# Patient Record
Sex: FEMALE | Race: WHITE | NOT HISPANIC OR LATINO | URBAN - METROPOLITAN AREA
[De-identification: names, ages, dates, MRNs, and addresses within clinical notes are randomized per-mention and may not be internally consistent; named-entity substitution may affect disease eponyms.]

---

## 2023-11-06 PROBLEM — R10.2 VULVAR PAIN: Status: ACTIVE | Noted: 2023-11-06

## 2023-11-06 PROBLEM — E55.9 VITAMIN D DEFICIENCY: Status: ACTIVE | Noted: 2023-11-06

## 2023-11-06 PROBLEM — M17.0 OSTEOARTHRITIS OF KNEES, BILATERAL: Status: ACTIVE | Noted: 2023-11-06

## 2023-11-06 PROBLEM — R20.2 PARESTHESIAS: Status: ACTIVE | Noted: 2023-11-06

## 2023-11-06 PROBLEM — L82.1 SEBORRHEIC KERATOSIS: Status: ACTIVE | Noted: 2023-11-06

## 2023-11-06 PROBLEM — M54.50 CHRONIC LOW BACK PAIN: Status: ACTIVE | Noted: 2023-11-06

## 2023-11-06 PROBLEM — M85.80 OSTEOPENIA: Status: ACTIVE | Noted: 2023-11-06

## 2023-11-06 PROBLEM — R60.0 EDEMA OF BOTH LOWER EXTREMITIES: Status: ACTIVE | Noted: 2023-11-06

## 2023-11-06 PROBLEM — R60.0 EDEMA OF BOTH LEGS: Status: ACTIVE | Noted: 2023-11-06

## 2023-11-06 PROBLEM — R19.8 STRAINING DURING BOWEL MOVEMENTS: Status: ACTIVE | Noted: 2023-11-06

## 2023-11-06 PROBLEM — G89.29 CHRONIC LOW BACK PAIN: Status: ACTIVE | Noted: 2023-11-06

## 2023-11-06 PROBLEM — M17.12 PRIMARY OSTEOARTHRITIS OF LEFT KNEE: Status: ACTIVE | Noted: 2023-11-06

## 2023-11-06 PROBLEM — R21 RASH: Status: ACTIVE | Noted: 2023-11-06

## 2023-11-06 PROBLEM — K57.92 DIVERTICULITIS: Status: ACTIVE | Noted: 2023-11-06

## 2023-11-06 PROBLEM — H10.10 ALLERGIC CONJUNCTIVITIS: Status: ACTIVE | Noted: 2023-11-06

## 2023-11-06 PROBLEM — L57.0 SENILE KERATOSIS: Status: ACTIVE | Noted: 2023-03-24

## 2023-11-06 PROBLEM — N63.20 BREAST MASS, LEFT: Status: ACTIVE | Noted: 2023-11-06

## 2023-11-06 PROBLEM — I10 HYPERTENSION: Status: ACTIVE | Noted: 2023-11-06

## 2023-11-06 PROBLEM — L57.0 SENILE HYPERKERATOSIS: Status: ACTIVE | Noted: 2023-11-06

## 2023-11-06 RX ORDER — LISINOPRIL 20 MG/1
20 TABLET ORAL DAILY
COMMUNITY
Start: 2017-05-08

## 2023-11-06 RX ORDER — DICLOFENAC SODIUM 10 MG/G
2 GEL TOPICAL
COMMUNITY
Start: 2022-09-27

## 2023-11-06 RX ORDER — LUBIPROSTONE 24 UG/1
24 CAPSULE ORAL
COMMUNITY
Start: 2022-10-17

## 2023-11-06 RX ORDER — AMLODIPINE BESYLATE 2.5 MG/1
2.5 TABLET ORAL DAILY
COMMUNITY
Start: 2020-10-28

## 2023-11-06 RX ORDER — CHOLECALCIFEROL (VITAMIN D3) 25 MCG
1000 TABLET ORAL DAILY
COMMUNITY
Start: 2016-09-27

## 2023-11-06 RX ORDER — CALCIUM CARBONATE/VITAMIN D3 600 MG-20
TABLET,CHEWABLE ORAL
COMMUNITY
Start: 2016-09-27

## 2023-11-07 RX ORDER — CETIRIZINE HYDROCHLORIDE 10 MG/1
1 TABLET ORAL DAILY
COMMUNITY
Start: 2020-06-11

## 2023-11-07 RX ORDER — POLYETHYLENE GLYCOL 3350 17 G/17G
17 POWDER, FOR SOLUTION ORAL EVERY OTHER DAY
COMMUNITY
Start: 2021-08-26

## 2023-11-07 RX ORDER — METHYLCELLULOSE
POWDER (GRAM) ORAL
COMMUNITY
Start: 2021-06-01

## 2023-11-08 ENCOUNTER — OFFICE VISIT (OUTPATIENT)
Dept: NEUROLOGY | Facility: CLINIC | Age: 83
End: 2023-11-08
Payer: MEDICARE

## 2023-11-08 VITALS
SYSTOLIC BLOOD PRESSURE: 199 MMHG | DIASTOLIC BLOOD PRESSURE: 94 MMHG | HEART RATE: 73 BPM | BODY MASS INDEX: 21.46 KG/M2 | HEIGHT: 63 IN | WEIGHT: 121.1 LBS

## 2023-11-08 DIAGNOSIS — R41.3 MEMORY LOSS: Primary | ICD-10-CM

## 2023-11-08 PROCEDURE — 1036F TOBACCO NON-USER: CPT | Performed by: PSYCHIATRY & NEUROLOGY

## 2023-11-08 PROCEDURE — 3077F SYST BP >= 140 MM HG: CPT | Performed by: PSYCHIATRY & NEUROLOGY

## 2023-11-08 PROCEDURE — 3080F DIAST BP >= 90 MM HG: CPT | Performed by: PSYCHIATRY & NEUROLOGY

## 2023-11-08 PROCEDURE — 99205 OFFICE O/P NEW HI 60 MIN: CPT | Performed by: PSYCHIATRY & NEUROLOGY

## 2023-11-08 PROCEDURE — 1126F AMNT PAIN NOTED NONE PRSNT: CPT | Performed by: PSYCHIATRY & NEUROLOGY

## 2023-11-08 PROCEDURE — 1159F MED LIST DOCD IN RCRD: CPT | Performed by: PSYCHIATRY & NEUROLOGY

## 2023-11-08 RX ORDER — ESCITALOPRAM OXALATE 5 MG/1
5 TABLET ORAL DAILY
Qty: 30 TABLET | Refills: 2 | Status: SHIPPED | OUTPATIENT
Start: 2023-11-08 | End: 2023-11-30 | Stop reason: SDUPTHER

## 2023-11-08 ASSESSMENT — SLU MENTAL STATUS EXAMINATION (SLUMS)
PROVIDE NAMES OF ANIMALS: 2
WHAT YEAR IS THIS: 1
WHAT DAY OF THE WEEK IS TODAY: 1
BACKWARD DIGIT SPAN: 1
CALCULATE MONEY SPENT AND MONEY LEFT: 1
QUESTIONS ABOUT STORY: 4
TOTAL SCORE: 17
REMEMBER AND REPEAT FIVE WORDS: 2
PICK OUT TRIANGLE: 2
WHAT STATE ARE WE IN: 1
DRAW A CLOCK: 2
PATIENT HAS COMPLETED HIGH SCHOOL OR ABOVE: YES

## 2023-11-08 ASSESSMENT — PAIN SCALES - GENERAL: PAINLEVEL: 0-NO PAIN

## 2023-11-08 NOTE — PATIENT INSTRUCTIONS
Recommend that we get some additional blood tests and an MRI for further evaluation of the memory. Then will consider getting Neuro-psych testing. I would like to try a low dose of a medication for anxiety to see if that helps. Will decide about follow up here or getting further testing based on response to anxiety medication.

## 2023-11-08 NOTE — PROGRESS NOTES
Subjective     Ruth Cee is a right handed  83 y.o. year old female who presents with Memory Loss (NPV). Patient is accompanied by: child daughter and son .    Visit type: new patient visit    HPI     She reports that she is having problems with her memory.  Her son started to notice it about one year ago and thought the isolation with covid affected her.  She lives alone.      She has a boyfriend in [place] so she goes back and forth.  She will stay there a few months.  The finances were fine.  She does drive and has not gotten lost.  She needs assistance with getting around the airport.  Her sister is older and not having any problems. There has been some confusion with using the thermostat and the  and the washer/dryer.  She uses the microwave.  Her Aunt cooks a lot of the meals.      She denies any problems with depression. She does get anxiety.  She gets irritated more easily.      She retired in 2005 from [employer] and has a high school.  She had to write things down to remember them.      There is no history of stroke. There is no family history of dementia.      Family reports that recently when she got to [place] to see her boyfriend, she was initially very confused about where she was and why she was there. She did remember her boyfriend however otherwise it took her several days to adjust to the change in location. Family reports they had never seen her like that and feel that since that trip she has not returned back to her prior baseline.     Review of Systems  All other systems have been reviewed and are negative for complaint.     Past Medical History:   Diagnosis Date    Acute upper respiratory infection, unspecified 12/28/2016    URI, acute    Diverticulitis of intestine, part unspecified, without perforation or abscess without bleeding     Diverticulitis    Encounter for screening for malignant neoplasm of cervix     Pap smear for cervical cancer screening    Other  conditions influencing health status     White coat hypertension    Pelvic and perineal pain     Vulvar pain    Personal history of other drug therapy 09/26/2016    History of influenza vaccination    Personal history of other infectious and parasitic diseases 11/11/2016    History of herpes zoster    Personal history of other infectious and parasitic diseases 12/15/2015    History of herpes zoster    Sleep related leg cramps 09/13/2017    Leg cramps, sleep related     Family History   Problem Relation Name Age of Onset    Diabetes Sister      Heart disease Maternal Grandmother      Diabetes Maternal Grandmother       Past Surgical History:   Procedure Laterality Date    COLONOSCOPY  01/09/2020    Complete Colonoscopy    GALLBLADDER SURGERY  10/06/2014    Gallbladder Surgery    OTHER SURGICAL HISTORY  06/01/2021    Cataract surgery    RECTAL VAGINAL FISTULECTOMY  06/24/2016    Rectocele Repair      Social History     Tobacco Use    Smoking status: Never    Smokeless tobacco: Never   Substance Use Topics    Alcohol use: Not Currently     Comment: occassionally          Objective     Neurological Exam    Physical Exam    On general examination, the patient is well appearing and well groomed. Heart is regular, rate and rhythm. Lungs are clear to ausculation bilaterally. There is no peripheral edema. Normal pedal pulses bilaterally. No carotid bruits.   On neurologic examination, the history is related in quite good detail with no obvious deficit of attention, memory or language. Fund of knowledge is adequate. Orientation is intact to person, place and time. Spontaneous speech is fluent with no paraphasic or aphasic errors. During SLUMs evaluation she did become anxious and had some difficulty at times that seemed secondary to the anxiety. On cranial nerve exam, the pupils are equal, round and reactive to light. Extraocular movements are full, without nystagmus. She reports no double vision on sustained upgaze or  lateral gaze. Visual fields are full to confrontation. The fundi are benign with normal sharp disc margins. There is no bulbofacial weakness or ptosis. The tongue is midline with no wasting or fasciculations. The palate elevates symmetrically. Facial sensation is intact to light touch and pinprick bilaterally. Hearing is intact to finger rub bilaterally. Shoulder shrug is 5/5 bilaterally.  Neck flexion and extension have full strength. Motor examination reveals normal tone and bulk in the upper and lower extremities bilaterally. There are no fasciculations. There is full strength in the proximal and distal muscles of the upper and lower extremities bilaterally. Deep tendon reflexes are 2/4 and symmetric throughout the upper and lower extremities bilaterally, including the ankle jerks. Plantar responses are flexor bilaterally. Fine finger movements and rapid alternating movements are done well in both hands. There is no tremor. On coordination testing, finger-nose-finger testing are done well bilaterally. Sensory examination reveals normal light touch sensation in the distal upper and lower extremities bilaterally. Gait is normal.     Scales and Scores:  Slums 17/30            Assessment/Plan   Ms. Cee is a 83 year old woman presenting to the neurology clinic today for initial evaluation of memory loss. Cognitive evaluation shows 17/30 on the slums however evaluation slightly limited by anxiety today.  Recommend to start treatment for anxiety with Lexapro 5 mg daily and get an Mri brain and blood work to look for other causes of cognitive change.  If family feels that she improves with the lexapro then will have her return to repeat SLUMs, if not will move forward with Neuro-psych testing.    Nicole Churchill, DO

## 2023-11-30 DIAGNOSIS — R41.3 MEMORY LOSS: ICD-10-CM

## 2023-12-01 RX ORDER — ESCITALOPRAM OXALATE 5 MG/1
5 TABLET ORAL DAILY
Qty: 30 TABLET | Refills: 2 | Status: SHIPPED | OUTPATIENT
Start: 2023-12-01 | End: 2024-01-24 | Stop reason: SDUPTHER

## 2023-12-11 ENCOUNTER — ANCILLARY PROCEDURE (OUTPATIENT)
Dept: RADIOLOGY | Facility: CLINIC | Age: 83
End: 2023-12-11
Payer: MEDICARE

## 2023-12-11 DIAGNOSIS — R41.3 MEMORY LOSS: ICD-10-CM

## 2023-12-11 PROCEDURE — 70551 MRI BRAIN STEM W/O DYE: CPT | Performed by: RADIOLOGY

## 2023-12-11 PROCEDURE — 70551 MRI BRAIN STEM W/O DYE: CPT

## 2023-12-15 ENCOUNTER — TELEPHONE (OUTPATIENT)
Dept: NEUROSURGERY | Facility: CLINIC | Age: 83
End: 2023-12-15
Payer: MEDICARE

## 2023-12-15 DIAGNOSIS — R93.0 ABNORMAL MRI OF HEAD: Primary | ICD-10-CM

## 2023-12-15 NOTE — TELEPHONE ENCOUNTER
----- Message from Nicole Churchill DO sent at 12/15/2023  8:56 AM EST -----  Ok I placed the order for the CT head.  ----- Message -----  From: Iliana Greer MA  Sent: 12/14/2023   9:39 AM EST  To: Nicole Churchill DO    They are not sure if she's ever had a CT Had. They are okay for you to order if needed.  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  This can happen with age, most commonly with calcium deposition.  A ct head is the better study to get a look at this to make sure there is nothing concerning about it.  Has she ever had a ct head done anywhere? I could find one in our system.

## 2023-12-29 ENCOUNTER — LAB (OUTPATIENT)
Dept: LAB | Facility: LAB | Age: 83
End: 2023-12-29
Payer: MEDICARE

## 2023-12-29 ENCOUNTER — TELEPHONE (OUTPATIENT)
Dept: NEUROSURGERY | Facility: CLINIC | Age: 83
End: 2023-12-29

## 2023-12-29 ENCOUNTER — ANCILLARY PROCEDURE (OUTPATIENT)
Dept: RADIOLOGY | Facility: CLINIC | Age: 83
End: 2023-12-29
Payer: MEDICARE

## 2023-12-29 DIAGNOSIS — R41.3 MEMORY LOSS: ICD-10-CM

## 2023-12-29 DIAGNOSIS — R93.0 ABNORMAL MRI OF HEAD: ICD-10-CM

## 2023-12-29 LAB — VIT B12 SERPL-MCNC: 255 PG/ML (ref 211–911)

## 2023-12-29 PROCEDURE — 84425 ASSAY OF VITAMIN B-1: CPT

## 2023-12-29 PROCEDURE — 70450 CT HEAD/BRAIN W/O DYE: CPT

## 2023-12-29 PROCEDURE — 36415 COLL VENOUS BLD VENIPUNCTURE: CPT

## 2023-12-29 PROCEDURE — 70450 CT HEAD/BRAIN W/O DYE: CPT | Performed by: RADIOLOGY

## 2023-12-29 PROCEDURE — 82607 VITAMIN B-12: CPT

## 2023-12-29 NOTE — TELEPHONE ENCOUNTER
----- Message from Nicole Churchill DO sent at 12/29/2023  1:21 PM EST -----  Please let the patient know that her B12 is low. I would like her to start taking 2000 mcg daily for 2 weeks then continue with 1000 mcg daily.

## 2024-01-02 LAB — VIT B1 PYROPHOSHATE BLD-SCNC: 111 NMOL/L (ref 70–180)

## 2024-01-02 NOTE — TELEPHONE ENCOUNTER
----- Message from Nicole Churchill DO sent at 1/2/2024  2:27 PM EST -----  Please let the patient know that her CT head looks fine with no concerns for mineralization.

## 2024-01-04 ENCOUNTER — TELEPHONE (OUTPATIENT)
Dept: NEUROLOGY | Facility: CLINIC | Age: 84
End: 2024-01-04
Payer: MEDICARE

## 2024-01-24 ENCOUNTER — OFFICE VISIT (OUTPATIENT)
Dept: NEUROLOGY | Facility: CLINIC | Age: 84
End: 2024-01-24
Payer: MEDICARE

## 2024-01-24 ENCOUNTER — LAB (OUTPATIENT)
Dept: LAB | Facility: LAB | Age: 84
End: 2024-01-24
Payer: MEDICARE

## 2024-01-24 VITALS
BODY MASS INDEX: 22.4 KG/M2 | WEIGHT: 114.1 LBS | HEART RATE: 68 BPM | HEIGHT: 60 IN | DIASTOLIC BLOOD PRESSURE: 102 MMHG | SYSTOLIC BLOOD PRESSURE: 176 MMHG

## 2024-01-24 DIAGNOSIS — R41.3 MEMORY LOSS: ICD-10-CM

## 2024-01-24 PROBLEM — M19.91 LOCALIZED, PRIMARY OSTEOARTHRITIS: Status: ACTIVE | Noted: 2024-01-16

## 2024-01-24 PROBLEM — E63.9 DIETARY DEFICIENCY: Status: ACTIVE | Noted: 2023-11-06

## 2024-01-24 PROBLEM — R25.2 CRAMPS OF LOWER EXTREMITY: Status: ACTIVE | Noted: 2024-01-24

## 2024-01-24 PROBLEM — K59.00 CONSTIPATION: Status: ACTIVE | Noted: 2023-11-06

## 2024-01-24 PROCEDURE — 1036F TOBACCO NON-USER: CPT | Performed by: PSYCHIATRY & NEUROLOGY

## 2024-01-24 PROCEDURE — 99214 OFFICE O/P EST MOD 30 MIN: CPT | Performed by: PSYCHIATRY & NEUROLOGY

## 2024-01-24 PROCEDURE — 3080F DIAST BP >= 90 MM HG: CPT | Performed by: PSYCHIATRY & NEUROLOGY

## 2024-01-24 PROCEDURE — 1126F AMNT PAIN NOTED NONE PRSNT: CPT | Performed by: PSYCHIATRY & NEUROLOGY

## 2024-01-24 PROCEDURE — 36415 COLL VENOUS BLD VENIPUNCTURE: CPT

## 2024-01-24 PROCEDURE — 83921 ORGANIC ACID SINGLE QUANT: CPT

## 2024-01-24 PROCEDURE — 3077F SYST BP >= 140 MM HG: CPT | Performed by: PSYCHIATRY & NEUROLOGY

## 2024-01-24 RX ORDER — MELOXICAM 7.5 MG/1
7.5 TABLET ORAL DAILY
COMMUNITY
Start: 2024-01-16

## 2024-01-24 RX ORDER — CHOLECALCIFEROL (VITAMIN D3) 125 MCG
CAPSULE ORAL
COMMUNITY

## 2024-01-24 RX ORDER — ESCITALOPRAM OXALATE 10 MG/1
10 TABLET ORAL DAILY
Qty: 90 TABLET | Refills: 1 | Status: SHIPPED | OUTPATIENT
Start: 2024-01-24 | End: 2025-01-23

## 2024-01-24 ASSESSMENT — PATIENT HEALTH QUESTIONNAIRE - PHQ9
SUM OF ALL RESPONSES TO PHQ9 QUESTIONS 1 AND 2: 0
1. LITTLE INTEREST OR PLEASURE IN DOING THINGS: NOT AT ALL
2. FEELING DOWN, DEPRESSED OR HOPELESS: NOT AT ALL

## 2024-01-24 ASSESSMENT — PAIN SCALES - GENERAL: PAINLEVEL: 0-NO PAIN

## 2024-01-24 NOTE — PROGRESS NOTES
Subjective   Ruth Cee is a 83 y.o. female who comes in for follow up of memory loss.     Her sister thinks she is less anxious.  There has been no improvement in memory.      She is taking B12 for 3-4 weeks now.           Review of Systems    Objective   BP (!) 176/102 (BP Location: Right arm, Patient Position: Sitting, BP Cuff Size: Adult)   Pulse 68   Ht 1.524 m (5')   Wt 51.8 kg (114 lb 1.6 oz)   BMI 22.28 kg/m²   Neurological Exam  Physical Exam        MR brain wo IV contrast    Result Date: 12/11/2023  Interpreted By:  Mahendra Elder, STUDY: MR BRAIN WO IV CONTRAST;  12/11/2023 2:41 pm   INDICATION: Signs/Symptoms:memory loss, slums eval 17/30.   COMPARISON: None.   ACCESSION NUMBER(S): [id number]   ORDERING CLINICIAN: EDEN ETIENNE   TECHNIQUE: Axial T2, FLAIR, DWI, gradient echo T2 and sagittal and coronal T1 weighted images of brain were acquired.   FINDINGS: Mild global parenchymal volume loss with consequent ex vacuo prominence of the ventricular system sulci and cisterns. Minimal nonspecific periventricular and subcortical T2 FLAIR hyperintensity most likely due to chronic small vessel ischemic changes given the age of the patient. No acute intracranial hemorrhage mass effect or midline shift. Diminutive appearance of the distal left vertebral artery, apparently hypoplastic. Major intracranial flow voids are otherwise patent. No restricted diffusion to suggest recent ischemia. Minimal increased susceptibility of the basal ganglia and deep cerebellar nuclei probably due to mineralization. Minimal increased susceptibility in the central aspect of the steve of uncertain significance possibly due to tiny vascular malformation or artifact for instance on image 27 series 7. Patient is status post bilateral cataract extraction. Mucoperiosteal thickening left maxillary sinus. Visualized paranasal sinuses and mastoid air cells are otherwise clear.       Minimal prominence of increased susceptibility of the  basal ganglia and deep cerebellar nuclei presumably due to mineralization. Consider noncontrast head CT and advise clinical correlation for metabolic abnormality if clinically indicated. Nonspecific white matter changes may be due to chronic small vessel ischemic changes given the age of the patient.   MACRO: None   Signed by: Mahendra Elder 12/11/2023 8:19 PM Dictation workstation:   [id number]          Assessment/Plan   Ms. Cee is a 83 year old woman presenting for follow up of cogntive changes. Initial evaluation showed slums of 17/30 although limited by anxiety.  Anxiety has improved with low dose lexapro, will increase to 10 mg today. No improvement in memory loss.  MRI showing ?mineralization in basal ganglia, no correlation on CT head.  Can be associated with cognitive decline.  Will get Neuro-psychological testing for further evaluation.      B12 low, on replacement. Will repeat level today.    Nicole Churchill, DO

## 2024-01-28 LAB — METHYLMALONATE SERPL-SCNC: 0.15 UMOL/L (ref 0–0.4)

## 2024-05-03 ENCOUNTER — APPOINTMENT (OUTPATIENT)
Dept: PSYCHOLOGY | Facility: CLINIC | Age: 84
End: 2024-05-03
Payer: MEDICARE

## 2024-05-07 ENCOUNTER — NURSING HOME VISIT (OUTPATIENT)
Dept: POST ACUTE CARE | Facility: EXTERNAL LOCATION | Age: 84
End: 2024-05-07
Payer: MEDICARE

## 2024-05-07 DIAGNOSIS — R33.9 URINARY RETENTION: ICD-10-CM

## 2024-05-07 DIAGNOSIS — I11.9 CARDIOMYOPATHY, HYPERTENSIVE, BENIGN, WITHOUT HEART FAILURE (MULTI): ICD-10-CM

## 2024-05-07 DIAGNOSIS — I43 CARDIOMYOPATHY, HYPERTENSIVE, BENIGN, WITHOUT HEART FAILURE (MULTI): ICD-10-CM

## 2024-05-07 DIAGNOSIS — F03.B0 MODERATE DEMENTIA, UNSPECIFIED DEMENTIA TYPE, UNSPECIFIED WHETHER BEHAVIORAL, PSYCHOTIC, OR MOOD DISTURBANCE OR ANXIETY (MULTI): ICD-10-CM

## 2024-05-07 DIAGNOSIS — K63.1 BOWEL PERFORATION (MULTI): Primary | ICD-10-CM

## 2024-05-07 DIAGNOSIS — E87.6 HYPOKALEMIA: ICD-10-CM

## 2024-05-07 PROCEDURE — 99305 1ST NF CARE MODERATE MDM 35: CPT | Performed by: EMERGENCY MEDICINE

## 2024-05-07 PROCEDURE — 99497 ADVNCD CARE PLAN 30 MIN: CPT | Performed by: EMERGENCY MEDICINE

## 2024-05-07 NOTE — LETTER
Patient: Ruth Cee  : 1940    Encounter Date: 2024    Ruth Cee   84 y.o.  female  @location@            Assessment and Plan:  History and physical    All Diagnoses This Visit Abdominal pain Dizziness Fall Nausea Perforated abdominal viscus Acute delirium  Urinary retention  Hypokalemia  Uncontrolled hypertension      Medications   New pantoprazole (pantoprazole 40 mg oral delayed release tablet)1 Tabs Oral TWICE A DAY for 90 Days. Refills: 0. tamsulosin (Flomax 0.4 mg oral capsule)1 Capsules Oral AT BEDTIME for 2 Weeks.   Changed amLODIPine (amLODIPine 10 mg oral tablet)1 Tabs Oral DAILY. lisinopril (lisinopril 20 mg oral tablet)2 Tabs Oral DAILY.   Unchanged calcium carbonate (calcium carbonate 1250 mg (500 mg elemental calcium) oral tablet)Oral THREE TIMES A DAY. cholecalciferol (Vitamin D3 25 mcg (1000 intl units) oral tablet)Oral DAILY. escitalopram (escitalopram 10 mg oral tablet)1 Tabs Oral DAILY.   Discontinued meloxicam (meloxicam 15 mg oral tablet)1 Tabs Oral DAILY as needed Mild Pain. Hospital Course     This is a 84 years old lady who presented to the hospital for abdominal pain. In the ED imaging studies showed air intra-abdominal, evaluated by general surgery recommended urgent exploratory laparotomy for possible perforation.  Patient underwent exploratory laparotomy and found to have perforated gastric ulcer with no abdominal contamination. Patient was started on IV antibiotics, she has prolonged hospital stay but she continued to recover slowly.  During her stay patient developed acute delirium required a sitter and psych consulted, medication adjusted and her delirium was improving slowly. Currently she is more awake and alert she does not require any sitters.  Today surgical team cleared the patient for discharge to follow-up outpatient.  PT OT evaluated the patient recommended SNF she will be discharged.    I discussed advanced care planning including the explanation and  discussion of advanced directives. If patient does not have current up to date documents, examples and information provided on how to create both living will and power of . Patient was encouraged to work on completing these documents.  Information and advise was also provided on DO NOT RESUSCITATE and patient encouraged to consider this  Patient is not sure about DNR at this time.  CODE STATUS is on file with the facility    Source of history: Nurse, Medical personnel, Medical record, Patient.  History limitation: None.    HPI:  History and physical    Patient is unable to give any detailed history and therefore history is obtained from the chart  No acute complaints voiced by the patient or acute concerns raised by nursing    History of hospitalization-    Chief Complaint Abdominal pain History of Present Illness Patient is an 84-year-old female with history of diverticulosis, hypertension, chronic back pain, mild dementia. She has been experiencing intermittent abdominal pain for the past few weeks. She does have a constipation and she takes laxatives as needed. Yesterday she had a good bowel movement. Today she had noted to have severe abdominal pain she doubled over and fell. No head or neck injuries no loss of consciousness. EMS was called and she was brought to the emergency room. Initial blood pressure was low and she received IV fluids bolus. Blood pressure has improved. In the ED labs are unremarkable. Imaging indicated free air intra-abdominal he. She was evaluated by general surgeon who recommended exploratory laparotomy. Patient denies any chest pain, no shortness of breath, no fever or chills. No upper respiratory symptoms. No history of CAD.    Problem List/Past Medical History Ongoing Active living will Bilateral lower limb edema Breast lump Chronic low back pain Diverticulitis HTN (hypertension) Intentional weight loss Mammogram declined Osteoarthritis of left knee joint Osteopenia  Paresthesia Patient encounter status Seborrheic keratosis Senile hyperkeratosis Vitamin D deficiency Vulval pain Historical Acute upper respiratory infection Cramp in lower limb History of herpes zoster History of influenza vaccination Procedure/Surgical History Colonoscopy.: 07/07/22  Cystoscopy.  Cholecystectomy;  Rectocele Repair. Medications Inpatient acetaminophen, 650 mg= 2 tabs, ORAL, [id number], PRN acetaminophen, 650 mg= 2 tabs, ORAL, [id number], PRN acetaminophen, 650 mg= 2 tabs, ORAL, [id number], PRN melatonin, 5 mg= 1 tabs, ORAL, [id number], PRN morphine, 2 mg= 1 mL, IV Push, [id number], PRN naloxone = Narcan, 0.4 mg= 1 mL, IV Push, PRN, PRN ondansetron(Zofran), 4 mg= 2 mL, IV Push, [id number], PRN pantoprazole 80 mg [8 mg/hr] + Sodium Chloride 0.9%(NS) 100 mL(pantoprazole additive 80 mg [8 mg/hr] + Sodium Chloride 0.9%(NS) 100 mL), 100 mL, IV, Routine, Start: 04/24/2024 11:20:00 EDT, Rate: 10 mL/hr, 10 hr, Total Volume (mL): 100, Populate Charting Weight From Order, 1.57, m2 piperacillin-tazobactam(Zosyn), 3.375 g= 50 mL, IV Piggyback, [id number] Sodium Chloride 0.9%(NS) 1,000 mL(NS 1,000 mL), 1000 mL, IV Home amLODIPine(amLODIPine 2.5 mg oral tablet), 1 tabs, ORAL, DAILY amLODIPine(amLODIPine 5 mg oral tablet), 5 mg= 1 tabs, ORAL, DAILY calcium carbonate(calcium carbonate 1250 mg (500 mg elemental calcium) oral tablet), ORAL, TID cholecalciferol(Vitamin D3 25 mcg (1000 intl units) oral tablet), ORAL, DAILY diclofenac topical(Voltaren 1% topical gel), 2 g, Topical, QID, PRN, 3 refills lisinopril(lisinopril 20 mg oral tablet), 1 tabs, ORAL, DAILY, 3 refills lubiprostone(Amitiza 24 mcg oral capsule), 24 mcg= 1 caps, ORAL, BID, 1 refills polyethylene glycol 3350(MiraLax oral powder for reconstitution), 17 g, ORAL, DAILY psyllium(Metamucil), 1.7 g, ORAL Allergies No Known Allergies Social History   Alcohol Use:Current Type:Wine Frequency:1-2 times per year Average drinks per episode in last year:2    Employment/School Status:Retired Description:[employer]   Other Name:lives alone   Substance Abuse - Denies Substance Abuse   Tobacco Use:Never (less than 100 in lifetime) Family History Uterine cancer: Mother. Health Status Family Member(s) Sister: History is negative  Family Member(s) Relationship: Mother, Age: 94 Years Relationship: Father, Age: 97 Years, Cause: fall  Current Outpatient Medications   Medication Sig Dispense Refill   • amLODIPine (Norvasc) 2.5 mg tablet Take 1 tablet (2.5 mg) by mouth once daily.     • calcium carbonate-vitamin D3 (Caltrate 600 plus D) 600 mg-20 mcg (800 unit) tablet,chewable Chew.     • cetirizine (ZyrTEC) 10 mg tablet Take 1 tablet (10 mg) by mouth once daily.     • cholecalciferol (Vitamin D-3) 25 MCG (1000 UT) tablet Take 1 tablet (1,000 Units) by mouth once daily.     • diclofenac sodium (Voltaren) 1 % gel gel 0.5 Applications.     • escitalopram (Lexapro) 10 mg tablet Take 1 tablet (10 mg) by mouth once daily. 90 tablet 1   • lisinopril 20 mg tablet Take 1 tablet (20 mg) by mouth once daily.     • lubiprostone (Amitiza) 24 mcg capsule 1 capsule (24 mcg).     • mecobalamin, vitamin B12, 1,000 mcg tablet,disintegrating Place under the tongue.     • meloxicam (Mobic) 7.5 mg tablet Take 1 tablet (7.5 mg) by mouth once daily.     • methylcellulose, laxative, (CitruceL Sugar Free) powder Take by mouth. 1 heaping tablespoon 1-2 times per day, gradually increase as needed     • polyethylene glycol (Glycolax, Miralax) 17 gram/dose powder Take 17 g by mouth every other day.       No current facility-administered medications for this visit.       Physical Exam:  Vital signs as per nursing/MA documentation were reviewed  General appearance: Alert and in no acute distress  HEENT: Normal Inspection  Neck - Normal Inspection  Respiratory : No respiratory distress. Lungs are clear   Cardiovascular: heart rate normal. No gallop  Back - normal inspection  Skin  inspection:Warm  Musculoskeletal : No deformities  Neuro : Limited exam. Baseline    ROS: Review of symptoms is negative except for what is mentioned in HPI    Results/Data  Records including but not limited to electronic medical records, relevant lab work and imaging from patient's health care facility encounter were reviewed and independently verified      Charting was completed using voice recognition technology and may include unintended errors.    Discussed with patient/family, RN, and NP.      Electronically Signed By: Ayush Low MD   5/7/24  4:44 PM

## 2024-05-07 NOTE — PROGRESS NOTES
Ruth Cee   84 y.o.  female  @[email or web address]@            Assessment and Plan:  History and physical    All Diagnoses This Visit Abdominal pain Dizziness Fall Nausea Perforated abdominal viscus Acute delirium  Urinary retention  Hypokalemia  Uncontrolled hypertension      Medications   New pantoprazole (pantoprazole 40 mg oral delayed release tablet)1 Tabs Oral TWICE A DAY for 90 Days. Refills: 0. tamsulosin (Flomax 0.4 mg oral capsule)1 Capsules Oral AT BEDTIME for 2 Weeks.   Changed amLODIPine (amLODIPine 10 mg oral tablet)1 Tabs Oral DAILY. lisinopril (lisinopril 20 mg oral tablet)2 Tabs Oral DAILY.   Unchanged calcium carbonate (calcium carbonate 1250 mg (500 mg elemental calcium) oral tablet)Oral THREE TIMES A DAY. cholecalciferol (Vitamin D3 25 mcg (1000 intl units) oral tablet)Oral DAILY. escitalopram (escitalopram 10 mg oral tablet)1 Tabs Oral DAILY.   Discontinued meloxicam (meloxicam 15 mg oral tablet)1 Tabs Oral DAILY as needed Mild Pain. Hospital Course     This is a 84 years old lady who presented to the hospital for abdominal pain. In the ED imaging studies showed air intra-abdominal, evaluated by general surgery recommended urgent exploratory laparotomy for possible perforation.  Patient underwent exploratory laparotomy and found to have perforated gastric ulcer with no abdominal contamination. Patient was started on IV antibiotics, she has prolonged hospital stay but she continued to recover slowly.  During her stay patient developed acute delirium required a sitter and psych consulted, medication adjusted and her delirium was improving slowly. Currently she is more awake and alert she does not require any sitters.  Today surgical team cleared the patient for discharge to follow-up outpatient.  PT OT evaluated the patient recommended SNF she will be discharged.    I discussed advanced care planning including the explanation and discussion of advanced directives. If patient does not have current up to  date documents, examples and information provided on how to create both living will and power of . Patient was encouraged to work on completing these documents.  Information and advise was also provided on DO NOT RESUSCITATE and patient encouraged to consider this  Patient is not sure about DNR at this time.  CODE STATUS is on file with the facility    Source of history: Nurse, Medical personnel, Medical record, Patient.  History limitation: None.    HPI:  History and physical    Patient is unable to give any detailed history and therefore history is obtained from the chart  No acute complaints voiced by the patient or acute concerns raised by nursing    History of hospitalization-    Chief Complaint Abdominal pain History of Present Illness Patient is an 84-year-old female with history of diverticulosis, hypertension, chronic back pain, mild dementia. She has been experiencing intermittent abdominal pain for the past few weeks. She does have a constipation and she takes laxatives as needed. Yesterday she had a good bowel movement. Today she had noted to have severe abdominal pain she doubled over and fell. No head or neck injuries no loss of consciousness. EMS was called and she was brought to the emergency room. Initial blood pressure was low and she received IV fluids bolus. Blood pressure has improved. In the ED labs are unremarkable. Imaging indicated free air intra-abdominal he. She was evaluated by general surgeon who recommended exploratory laparotomy. Patient denies any chest pain, no shortness of breath, no fever or chills. No upper respiratory symptoms. No history of CAD.    Problem List/Past Medical History Ongoing Active living will Bilateral lower limb edema Breast lump Chronic low back pain Diverticulitis HTN (hypertension) Intentional weight loss Mammogram declined Osteoarthritis of left knee joint Osteopenia Paresthesia Patient encounter status Seborrheic keratosis Senile hyperkeratosis  Vitamin D deficiency Vulval pain Historical Acute upper respiratory infection Cramp in lower limb History of herpes zoster History of influenza vaccination Procedure/Surgical History Colonoscopy.: 07/07/22  Cystoscopy.  Cholecystectomy;  Rectocele Repair. Medications Inpatient acetaminophen, 650 mg= 2 tabs, ORAL, [id number], PRN acetaminophen, 650 mg= 2 tabs, ORAL, [id number], PRN acetaminophen, 650 mg= 2 tabs, ORAL, [id number], PRN melatonin, 5 mg= 1 tabs, ORAL, [id number], PRN morphine, 2 mg= 1 mL, IV Push, [id number], PRN naloxone = Narcan, 0.4 mg= 1 mL, IV Push, PRN, PRN ondansetron(Zofran), 4 mg= 2 mL, IV Push, [id number], PRN pantoprazole 80 mg [8 mg/hr] + Sodium Chloride 0.9%(NS) 100 mL(pantoprazole additive 80 mg [8 mg/hr] + Sodium Chloride 0.9%(NS) 100 mL), 100 mL, IV, Routine, Start: 04/24/2024 11:20:00 EDT, Rate: 10 mL/hr, 10 hr, Total Volume (mL): 100, Populate Charting Weight From Order, 1.57, m2 piperacillin-tazobactam(Zosyn), 3.375 g= 50 mL, IV Piggyback, [id number] Sodium Chloride 0.9%(NS) 1,000 mL(NS 1,000 mL), 1000 mL, IV Home amLODIPine(amLODIPine 2.5 mg oral tablet), 1 tabs, ORAL, DAILY amLODIPine(amLODIPine 5 mg oral tablet), 5 mg= 1 tabs, ORAL, DAILY calcium carbonate(calcium carbonate 1250 mg (500 mg elemental calcium) oral tablet), ORAL, TID cholecalciferol(Vitamin D3 25 mcg (1000 intl units) oral tablet), ORAL, DAILY diclofenac topical(Voltaren 1% topical gel), 2 g, Topical, QID, PRN, 3 refills lisinopril(lisinopril 20 mg oral tablet), 1 tabs, ORAL, DAILY, 3 refills lubiprostone(Amitiza 24 mcg oral capsule), 24 mcg= 1 caps, ORAL, BID, 1 refills polyethylene glycol 3350(MiraLax oral powder for reconstitution), 17 g, ORAL, DAILY psyllium(Metamucil), 1.7 g, ORAL Allergies No Known Allergies Social History   Alcohol Use:Current Type:Wine Frequency:1-2 times per year Average drinks per episode in last year:2   Employment/School Status:Retired Description:[employer]   Other Name:lives alone    Substance Abuse - Denies Substance Abuse   Tobacco Use:Never (less than 100 in lifetime) Family History Uterine cancer: Mother. Health Status Family Member(s) Sister: History is negative  Family Member(s) Relationship: Mother, Age: 94 Years Relationship: Father, Age: 97 Years, Cause: fall  Current Outpatient Medications   Medication Sig Dispense Refill    amLODIPine (Norvasc) 2.5 mg tablet Take 1 tablet (2.5 mg) by mouth once daily.      calcium carbonate-vitamin D3 (Caltrate 600 plus D) 600 mg-20 mcg (800 unit) tablet,chewable Chew.      cetirizine (ZyrTEC) 10 mg tablet Take 1 tablet (10 mg) by mouth once daily.      cholecalciferol (Vitamin D-3) 25 MCG (1000 UT) tablet Take 1 tablet (1,000 Units) by mouth once daily.      diclofenac sodium (Voltaren) 1 % gel gel 0.5 Applications.      escitalopram (Lexapro) 10 mg tablet Take 1 tablet (10 mg) by mouth once daily. 90 tablet 1    lisinopril 20 mg tablet Take 1 tablet (20 mg) by mouth once daily.      lubiprostone (Amitiza) 24 mcg capsule 1 capsule (24 mcg).      mecobalamin, vitamin B12, 1,000 mcg tablet,disintegrating Place under the tongue.      meloxicam (Mobic) 7.5 mg tablet Take 1 tablet (7.5 mg) by mouth once daily.      methylcellulose, laxative, (CitruceL Sugar Free) powder Take by mouth. 1 heaping tablespoon 1-2 times per day, gradually increase as needed      polyethylene glycol (Glycolax, Miralax) 17 gram/dose powder Take 17 g by mouth every other day.       No current facility-administered medications for this visit.       Physical Exam:  Vital signs as per nursing/MA documentation were reviewed  General appearance: Alert and in no acute distress  HEENT: Normal Inspection  Neck - Normal Inspection  Respiratory : No respiratory distress. Lungs are clear   Cardiovascular: heart rate normal. No gallop  Back - normal inspection  Skin inspection:Warm  Musculoskeletal : No deformities  Neuro : Limited exam. Baseline    ROS: Review of symptoms is  negative except for what is mentioned in HPI    Results/Data  Records including but not limited to electronic medical records, relevant lab work and imaging from patient's health care facility encounter were reviewed and independently verified      Charting was completed using voice recognition technology and may include unintended errors.    Discussed with patient/family, RN, and NP.

## 2024-05-30 ENCOUNTER — APPOINTMENT (OUTPATIENT)
Dept: NEUROLOGY | Facility: CLINIC | Age: 84
End: 2024-05-30
Payer: MEDICARE

## 2024-06-06 ENCOUNTER — APPOINTMENT (OUTPATIENT)
Dept: PSYCHOLOGY | Facility: CLINIC | Age: 84
End: 2024-06-06
Payer: MEDICARE

## 2025-08-21 ENCOUNTER — APPOINTMENT (OUTPATIENT)
Dept: CARDIOLOGY | Facility: HOSPITAL | Age: 85
DRG: 884 | End: 2025-08-21
Payer: MEDICARE

## 2025-08-21 ENCOUNTER — HOSPITAL ENCOUNTER (INPATIENT)
Facility: HOSPITAL | Age: 85
LOS: 1 days | Discharge: PSYCHIATRIC OR SUBSTANCE USE FACILITY | DRG: 884 | End: 2025-08-23
Attending: EMERGENCY MEDICINE | Admitting: INTERNAL MEDICINE
Payer: MEDICARE

## 2025-08-21 ENCOUNTER — APPOINTMENT (OUTPATIENT)
Dept: RADIOLOGY | Facility: HOSPITAL | Age: 85
DRG: 884 | End: 2025-08-21
Payer: MEDICARE

## 2025-08-21 DIAGNOSIS — N39.0 UTI (URINARY TRACT INFECTION), BACTERIAL: Primary | ICD-10-CM

## 2025-08-21 DIAGNOSIS — A49.9 UTI (URINARY TRACT INFECTION), BACTERIAL: Primary | ICD-10-CM

## 2025-08-21 DIAGNOSIS — R46.89 AGGRESSIVE BEHAVIOR: ICD-10-CM

## 2025-08-21 LAB
ALBUMIN SERPL BCP-MCNC: 4.2 G/DL (ref 3.4–5)
ALP SERPL-CCNC: 78 U/L (ref 33–136)
ALT SERPL W P-5'-P-CCNC: 4 U/L (ref 7–45)
AMPHETAMINES UR QL SCN: NORMAL
ANION GAP SERPL CALC-SCNC: 12 MMOL/L (ref 10–20)
APAP SERPL-MCNC: <10 UG/ML (ref ?–30)
APPEARANCE UR: CLEAR
AST SERPL W P-5'-P-CCNC: 14 U/L (ref 9–39)
BACTERIA #/AREA URNS AUTO: ABNORMAL /HPF
BARBITURATES UR QL SCN: NORMAL
BASOPHILS # BLD AUTO: 0.04 X10*3/UL (ref 0–0.1)
BASOPHILS NFR BLD AUTO: 0.7 %
BENZODIAZ UR QL SCN: NORMAL
BILIRUB SERPL-MCNC: 0.6 MG/DL (ref 0–1.2)
BILIRUB UR STRIP.AUTO-MCNC: NEGATIVE MG/DL
BUN SERPL-MCNC: 23 MG/DL (ref 6–23)
BZE UR QL SCN: NORMAL
CALCIUM SERPL-MCNC: 9.4 MG/DL (ref 8.6–10.3)
CANNABINOIDS UR QL SCN: NORMAL
CARDIAC TROPONIN I PNL SERPL HS: 22 NG/L (ref 0–13)
CARDIAC TROPONIN I PNL SERPL HS: 23 NG/L (ref 0–13)
CHLORIDE SERPL-SCNC: 98 MMOL/L (ref 98–107)
CO2 SERPL-SCNC: 27 MMOL/L (ref 21–32)
COLOR UR: ABNORMAL
CREAT SERPL-MCNC: 1.01 MG/DL (ref 0.5–1.05)
EGFRCR SERPLBLD CKD-EPI 2021: 55 ML/MIN/1.73M*2
EOSINOPHIL # BLD AUTO: 0.05 X10*3/UL (ref 0–0.4)
EOSINOPHIL NFR BLD AUTO: 0.8 %
ERYTHROCYTE [DISTWIDTH] IN BLOOD BY AUTOMATED COUNT: 12.5 % (ref 11.5–14.5)
ETHANOL SERPL-MCNC: <10 MG/DL
FENTANYL+NORFENTANYL UR QL SCN: NORMAL
FLUAV RNA RESP QL NAA+PROBE: NOT DETECTED
FLUBV RNA RESP QL NAA+PROBE: NOT DETECTED
GLUCOSE SERPL-MCNC: 93 MG/DL (ref 74–99)
GLUCOSE UR STRIP.AUTO-MCNC: NORMAL MG/DL
HCT VFR BLD AUTO: 39.4 % (ref 36–46)
HGB BLD-MCNC: 13 G/DL (ref 12–16)
IMM GRANULOCYTES # BLD AUTO: 0.03 X10*3/UL (ref 0–0.5)
IMM GRANULOCYTES NFR BLD AUTO: 0.5 % (ref 0–0.9)
KETONES UR STRIP.AUTO-MCNC: NEGATIVE MG/DL
LEUKOCYTE ESTERASE UR QL STRIP.AUTO: ABNORMAL
LYMPHOCYTES # BLD AUTO: 1.72 X10*3/UL (ref 0.8–3)
LYMPHOCYTES NFR BLD AUTO: 29 %
MAGNESIUM SERPL-MCNC: 1.97 MG/DL (ref 1.6–2.4)
MCH RBC QN AUTO: 33.1 PG (ref 26–34)
MCHC RBC AUTO-ENTMCNC: 33 G/DL (ref 32–36)
MCV RBC AUTO: 100 FL (ref 80–100)
METHADONE UR QL SCN: NORMAL
MONOCYTES # BLD AUTO: 0.82 X10*3/UL (ref 0.05–0.8)
MONOCYTES NFR BLD AUTO: 13.8 %
NEUTROPHILS # BLD AUTO: 3.27 X10*3/UL (ref 1.6–5.5)
NEUTROPHILS NFR BLD AUTO: 55.2 %
NITRITE UR QL STRIP.AUTO: NEGATIVE
NRBC BLD-RTO: 0.5 /100 WBCS (ref 0–0)
OPIATES UR QL SCN: NORMAL
OXYCODONE+OXYMORPHONE UR QL SCN: NORMAL
PCP UR QL SCN: NORMAL
PH UR STRIP.AUTO: 6.5 [PH]
PLATELET # BLD AUTO: 217 X10*3/UL (ref 150–450)
POTASSIUM SERPL-SCNC: 3.8 MMOL/L (ref 3.5–5.3)
PROT SERPL-MCNC: 6.8 G/DL (ref 6.4–8.2)
PROT UR STRIP.AUTO-MCNC: NEGATIVE MG/DL
RBC # BLD AUTO: 3.93 X10*6/UL (ref 4–5.2)
RBC # UR STRIP.AUTO: NEGATIVE MG/DL
RBC #/AREA URNS AUTO: ABNORMAL /HPF
SALICYLATES SERPL-MCNC: <3 MG/DL (ref ?–20)
SARS-COV-2 RNA RESP QL NAA+PROBE: NOT DETECTED
SODIUM SERPL-SCNC: 133 MMOL/L (ref 136–145)
SP GR UR STRIP.AUTO: 1.01
TRANS CELLS #/AREA UR COMP ASSIST: ABNORMAL /HPF
UROBILINOGEN UR STRIP.AUTO-MCNC: NORMAL MG/DL
WBC # BLD AUTO: 5.9 X10*3/UL (ref 4.4–11.3)
WBC #/AREA URNS AUTO: ABNORMAL /HPF

## 2025-08-21 PROCEDURE — G0378 HOSPITAL OBSERVATION PER HR: HCPCS

## 2025-08-21 PROCEDURE — 2500000004 HC RX 250 GENERAL PHARMACY W/ HCPCS (ALT 636 FOR OP/ED): Performed by: NURSE PRACTITIONER

## 2025-08-21 PROCEDURE — 80143 DRUG ASSAY ACETAMINOPHEN: CPT | Performed by: NURSE PRACTITIONER

## 2025-08-21 PROCEDURE — 70450 CT HEAD/BRAIN W/O DYE: CPT

## 2025-08-21 PROCEDURE — 96372 THER/PROPH/DIAG INJ SC/IM: CPT | Performed by: INTERNAL MEDICINE

## 2025-08-21 PROCEDURE — 96365 THER/PROPH/DIAG IV INF INIT: CPT | Mod: 59

## 2025-08-21 PROCEDURE — 80307 DRUG TEST PRSMV CHEM ANLYZR: CPT | Performed by: NURSE PRACTITIONER

## 2025-08-21 PROCEDURE — 71045 X-RAY EXAM CHEST 1 VIEW: CPT | Mod: FOREIGN READ | Performed by: RADIOLOGY

## 2025-08-21 PROCEDURE — 84484 ASSAY OF TROPONIN QUANT: CPT | Performed by: NURSE PRACTITIONER

## 2025-08-21 PROCEDURE — 83735 ASSAY OF MAGNESIUM: CPT | Performed by: NURSE PRACTITIONER

## 2025-08-21 PROCEDURE — 85025 COMPLETE CBC W/AUTO DIFF WBC: CPT | Performed by: NURSE PRACTITIONER

## 2025-08-21 PROCEDURE — 93005 ELECTROCARDIOGRAM TRACING: CPT

## 2025-08-21 PROCEDURE — 71045 X-RAY EXAM CHEST 1 VIEW: CPT

## 2025-08-21 PROCEDURE — 87636 SARSCOV2 & INF A&B AMP PRB: CPT | Performed by: NURSE PRACTITIONER

## 2025-08-21 PROCEDURE — 2500000001 HC RX 250 WO HCPCS SELF ADMINISTERED DRUGS (ALT 637 FOR MEDICARE OP): Performed by: INTERNAL MEDICINE

## 2025-08-21 PROCEDURE — 2500000004 HC RX 250 GENERAL PHARMACY W/ HCPCS (ALT 636 FOR OP/ED): Performed by: INTERNAL MEDICINE

## 2025-08-21 PROCEDURE — 99285 EMERGENCY DEPT VISIT HI MDM: CPT | Mod: 25 | Performed by: EMERGENCY MEDICINE

## 2025-08-21 PROCEDURE — P9612 CATHETERIZE FOR URINE SPEC: HCPCS

## 2025-08-21 PROCEDURE — 99223 1ST HOSP IP/OBS HIGH 75: CPT | Performed by: INTERNAL MEDICINE

## 2025-08-21 PROCEDURE — 81001 URINALYSIS AUTO W/SCOPE: CPT | Performed by: NURSE PRACTITIONER

## 2025-08-21 PROCEDURE — 36415 COLL VENOUS BLD VENIPUNCTURE: CPT | Performed by: NURSE PRACTITIONER

## 2025-08-21 PROCEDURE — 80053 COMPREHEN METABOLIC PANEL: CPT | Performed by: NURSE PRACTITIONER

## 2025-08-21 PROCEDURE — 96361 HYDRATE IV INFUSION ADD-ON: CPT

## 2025-08-21 PROCEDURE — 87077 CULTURE AEROBIC IDENTIFY: CPT | Mod: PARLAB | Performed by: NURSE PRACTITIONER

## 2025-08-21 PROCEDURE — 36415 COLL VENOUS BLD VENIPUNCTURE: CPT | Performed by: EMERGENCY MEDICINE

## 2025-08-21 PROCEDURE — 84484 ASSAY OF TROPONIN QUANT: CPT | Performed by: EMERGENCY MEDICINE

## 2025-08-21 RX ORDER — HYDROXYZINE HYDROCHLORIDE 25 MG/1
25 TABLET, FILM COATED ORAL EVERY 8 HOURS PRN
Status: DISCONTINUED | OUTPATIENT
Start: 2025-08-21 | End: 2025-08-22

## 2025-08-21 RX ORDER — BROMPHENIRAMINE MALEATE, DEXTROMETHORPHAN HBR, PHENYLEPHRINE HCL, DIPHENHYDRAMINE HCL, PHENYLEPHRINE HCL 0.52G
1 KIT ORAL 2 TIMES DAILY
COMMUNITY

## 2025-08-21 RX ORDER — ACETAMINOPHEN 160 MG/5ML
650 SOLUTION ORAL EVERY 4 HOURS PRN
Status: DISCONTINUED | OUTPATIENT
Start: 2025-08-21 | End: 2025-08-23 | Stop reason: HOSPADM

## 2025-08-21 RX ORDER — OLANZAPINE 10 MG/2ML
5 INJECTION, POWDER, FOR SOLUTION INTRAMUSCULAR EVERY 6 HOURS PRN
Status: DISCONTINUED | OUTPATIENT
Start: 2025-08-21 | End: 2025-08-22

## 2025-08-21 RX ORDER — BISACODYL 10 MG/1
10 SUPPOSITORY RECTAL DAILY PRN
COMMUNITY

## 2025-08-21 RX ORDER — ACETAMINOPHEN 500 MG
5 TABLET ORAL NIGHTLY PRN
Status: DISCONTINUED | OUTPATIENT
Start: 2025-08-21 | End: 2025-08-23 | Stop reason: HOSPADM

## 2025-08-21 RX ORDER — ACETAMINOPHEN 500 MG
5 TABLET ORAL NIGHTLY
COMMUNITY

## 2025-08-21 RX ORDER — CALCIUM CARBONATE 200(500)MG
2.5 TABLET,CHEWABLE ORAL 3 TIMES DAILY
COMMUNITY

## 2025-08-21 RX ORDER — ACETAMINOPHEN 650 MG/1
650 SUPPOSITORY RECTAL EVERY 4 HOURS PRN
Status: DISCONTINUED | OUTPATIENT
Start: 2025-08-21 | End: 2025-08-23 | Stop reason: HOSPADM

## 2025-08-21 RX ORDER — POLYETHYLENE GLYCOL 3350 17 G/17G
17 POWDER, FOR SOLUTION ORAL DAILY
Status: DISCONTINUED | OUTPATIENT
Start: 2025-08-21 | End: 2025-08-23 | Stop reason: HOSPADM

## 2025-08-21 RX ORDER — FAMOTIDINE 20 MG/1
20 TABLET, FILM COATED ORAL EVERY MORNING
COMMUNITY
Start: 2025-08-02

## 2025-08-21 RX ORDER — CEFTRIAXONE 1 G/50ML
1 INJECTION, SOLUTION INTRAVENOUS ONCE
Status: COMPLETED | OUTPATIENT
Start: 2025-08-21 | End: 2025-08-21

## 2025-08-21 RX ORDER — CEFTRIAXONE 1 G/50ML
1 INJECTION, SOLUTION INTRAVENOUS EVERY 24 HOURS
Status: DISCONTINUED | OUTPATIENT
Start: 2025-08-22 | End: 2025-08-23 | Stop reason: HOSPADM

## 2025-08-21 RX ORDER — SERTRALINE HYDROCHLORIDE 50 MG/1
50 TABLET, FILM COATED ORAL EVERY MORNING
COMMUNITY
Start: 2025-08-02

## 2025-08-21 RX ORDER — MENTHOL 40 MG/ML
GEL TOPICAL 3 TIMES DAILY
COMMUNITY
End: 2025-08-23 | Stop reason: HOSPADM

## 2025-08-21 RX ORDER — HYDROCHLOROTHIAZIDE 12.5 MG/1
12.5 CAPSULE ORAL EVERY MORNING
COMMUNITY
Start: 2025-08-02

## 2025-08-21 RX ORDER — SERTRALINE HYDROCHLORIDE 50 MG/1
50 TABLET, FILM COATED ORAL EVERY MORNING
Status: DISCONTINUED | OUTPATIENT
Start: 2025-08-22 | End: 2025-08-23 | Stop reason: HOSPADM

## 2025-08-21 RX ORDER — HYDROXYZINE PAMOATE 25 MG/1
25 CAPSULE ORAL EVERY 8 HOURS PRN
COMMUNITY

## 2025-08-21 RX ORDER — AMOXICILLIN 250 MG
1 CAPSULE ORAL 2 TIMES DAILY
COMMUNITY
Start: 2024-10-25 | End: 2025-08-23 | Stop reason: HOSPADM

## 2025-08-21 RX ORDER — ACETAMINOPHEN 325 MG/1
650 TABLET ORAL EVERY MORNING
COMMUNITY
End: 2025-08-23 | Stop reason: HOSPADM

## 2025-08-21 RX ORDER — SODIUM CHLORIDE 9 MG/ML
100 INJECTION, SOLUTION INTRAVENOUS CONTINUOUS
Status: ACTIVE | OUTPATIENT
Start: 2025-08-21 | End: 2025-08-22

## 2025-08-21 RX ORDER — TRAMADOL HYDROCHLORIDE 50 MG/1
100 TABLET, FILM COATED ORAL EVERY 4 HOURS PRN
COMMUNITY
End: 2025-08-23 | Stop reason: HOSPADM

## 2025-08-21 RX ORDER — ACETAMINOPHEN 325 MG/1
650 TABLET ORAL EVERY 4 HOURS PRN
COMMUNITY

## 2025-08-21 RX ORDER — ACETAMINOPHEN 325 MG/1
650 TABLET ORAL EVERY 4 HOURS PRN
Status: DISCONTINUED | OUTPATIENT
Start: 2025-08-21 | End: 2025-08-23 | Stop reason: HOSPADM

## 2025-08-21 RX ORDER — ENOXAPARIN SODIUM 100 MG/ML
40 INJECTION SUBCUTANEOUS EVERY 24 HOURS
Status: DISCONTINUED | OUTPATIENT
Start: 2025-08-21 | End: 2025-08-23 | Stop reason: HOSPADM

## 2025-08-21 RX ORDER — TRAMADOL HYDROCHLORIDE 50 MG/1
50 TABLET, FILM COATED ORAL 3 TIMES DAILY
COMMUNITY
Start: 2025-08-02 | End: 2025-08-23 | Stop reason: HOSPADM

## 2025-08-21 RX ORDER — MEMANTINE HYDROCHLORIDE 10 MG/1
10 TABLET ORAL 2 TIMES DAILY
COMMUNITY
Start: 2025-08-19

## 2025-08-21 RX ORDER — ONDANSETRON 4 MG/1
4 TABLET, FILM COATED ORAL EVERY 12 HOURS PRN
COMMUNITY
Start: 2025-01-20

## 2025-08-21 RX ORDER — AMLODIPINE BESYLATE 10 MG/1
10 TABLET ORAL EVERY MORNING
COMMUNITY
Start: 2025-08-02

## 2025-08-21 RX ORDER — LISINOPRIL 20 MG/1
20 TABLET ORAL EVERY MORNING
Status: DISCONTINUED | OUTPATIENT
Start: 2025-08-22 | End: 2025-08-23 | Stop reason: HOSPADM

## 2025-08-21 RX ADMIN — SODIUM CHLORIDE 100 ML/HR: 0.9 INJECTION, SOLUTION INTRAVENOUS at 23:20

## 2025-08-21 RX ADMIN — SODIUM CHLORIDE 1000 ML: 0.9 INJECTION, SOLUTION INTRAVENOUS at 14:13

## 2025-08-21 RX ADMIN — HYDROXYZINE HYDROCHLORIDE 25 MG: 25 TABLET ORAL at 19:49

## 2025-08-21 RX ADMIN — ENOXAPARIN SODIUM 40 MG: 100 INJECTION SUBCUTANEOUS at 23:20

## 2025-08-21 RX ADMIN — OLANZAPINE 5 MG: 10 INJECTION, POWDER, FOR SOLUTION INTRAMUSCULAR at 18:37

## 2025-08-21 RX ADMIN — CEFTRIAXONE 1 G: 1 INJECTION, SOLUTION INTRAVENOUS at 15:04

## 2025-08-21 SDOH — SOCIAL STABILITY: SOCIAL NETWORK
DO YOU BELONG TO ANY CLUBS OR ORGANIZATIONS SUCH AS CHURCH GROUPS, UNIONS, FRATERNAL OR ATHLETIC GROUPS, OR SCHOOL GROUPS?: PATIENT UNABLE TO ANSWER

## 2025-08-21 SDOH — SOCIAL STABILITY: SOCIAL NETWORK: HOW OFTEN DO YOU ATTEND MEETINGS OF THE CLUBS OR ORGANIZATIONS YOU BELONG TO?: PATIENT UNABLE TO ANSWER

## 2025-08-21 SDOH — SOCIAL STABILITY: SOCIAL INSECURITY: POSSIBLE ABUSE REPORTED TO:: OTHER (COMMENT)

## 2025-08-21 SDOH — SOCIAL STABILITY: SOCIAL INSECURITY: ARE YOU MARRIED, WIDOWED, DIVORCED, SEPARATED, NEVER MARRIED, OR LIVING WITH A PARTNER?: PATIENT UNABLE TO ANSWER

## 2025-08-21 SDOH — ECONOMIC STABILITY: FOOD INSECURITY
WITHIN THE PAST 12 MONTHS, YOU WORRIED THAT YOUR FOOD WOULD RUN OUT BEFORE YOU GOT THE MONEY TO BUY MORE.: PATIENT UNABLE TO ANSWER

## 2025-08-21 SDOH — HEALTH STABILITY: MENTAL HEALTH: BEHAVIORAL HEALTH(WDL): WITHIN DEFINED LIMITS

## 2025-08-21 SDOH — SOCIAL STABILITY: SOCIAL NETWORK: HOW OFTEN DO YOU ATTEND CHURCH OR RELIGIOUS SERVICES?: PATIENT UNABLE TO ANSWER

## 2025-08-21 SDOH — HEALTH STABILITY: MENTAL HEALTH: HOW MANY DRINKS CONTAINING ALCOHOL DO YOU HAVE ON A TYPICAL DAY WHEN YOU ARE DRINKING?: PATIENT UNABLE TO ANSWER

## 2025-08-21 SDOH — SOCIAL STABILITY: SOCIAL NETWORK: EMOTIONAL SUPPORT GIVEN: REASSURE

## 2025-08-21 SDOH — HEALTH STABILITY: MENTAL HEALTH: HOW OFTEN DO YOU HAVE A DRINK CONTAINING ALCOHOL?: PATIENT UNABLE TO ANSWER

## 2025-08-21 SDOH — SOCIAL STABILITY: SOCIAL INSECURITY: WITHIN THE LAST YEAR, HAVE YOU BEEN AFRAID OF YOUR PARTNER OR EX-PARTNER?: PATIENT UNABLE TO ANSWER

## 2025-08-21 SDOH — HEALTH STABILITY: MENTAL HEALTH: HOW OFTEN DO YOU HAVE SIX OR MORE DRINKS ON ONE OCCASION?: PATIENT UNABLE TO ANSWER

## 2025-08-21 SDOH — HEALTH STABILITY: PHYSICAL HEALTH: ON AVERAGE, HOW MANY MINUTES DO YOU ENGAGE IN EXERCISE AT THIS LEVEL?: PATIENT UNABLE TO ANSWER

## 2025-08-21 SDOH — HEALTH STABILITY: MENTAL HEALTH: BEHAVIORS/MOOD: AGGRESSIVE VERBALLY, OTHERS;DEFIANT

## 2025-08-21 SDOH — SOCIAL STABILITY: SOCIAL INSECURITY
WITHIN THE LAST YEAR, HAVE YOU BEEN KICKED, HIT, SLAPPED, OR OTHERWISE PHYSICALLY HURT BY YOUR PARTNER OR EX-PARTNER?: PATIENT UNABLE TO ANSWER

## 2025-08-21 SDOH — HEALTH STABILITY: MENTAL HEALTH: NEEDS EXPRESSED: PHYSICAL

## 2025-08-21 SDOH — SOCIAL STABILITY: SOCIAL INSECURITY: HAVE YOU HAD ANY THOUGHTS OF HARMING ANYONE ELSE?: UNABLE TO ASSESS

## 2025-08-21 SDOH — SOCIAL STABILITY: SOCIAL INSECURITY
WITHIN THE LAST YEAR, HAVE YOU BEEN HUMILIATED OR EMOTIONALLY ABUSED IN OTHER WAYS BY YOUR PARTNER OR EX-PARTNER?: PATIENT UNABLE TO ANSWER

## 2025-08-21 SDOH — SOCIAL STABILITY: SOCIAL NETWORK: HOW OFTEN DO YOU GET TOGETHER WITH FRIENDS OR RELATIVES?: PATIENT UNABLE TO ANSWER

## 2025-08-21 SDOH — SOCIAL STABILITY: SOCIAL INSECURITY
WITHIN THE LAST YEAR, HAVE YOU BEEN RAPED OR FORCED TO HAVE ANY KIND OF SEXUAL ACTIVITY BY YOUR PARTNER OR EX-PARTNER?: PATIENT UNABLE TO ANSWER

## 2025-08-21 SDOH — HEALTH STABILITY: PHYSICAL HEALTH
HOW OFTEN DO YOU NEED TO HAVE SOMEONE HELP YOU WHEN YOU READ INSTRUCTIONS, PAMPHLETS, OR OTHER WRITTEN MATERIAL FROM YOUR DOCTOR OR PHARMACY?: PATIENT UNABLE TO RESPOND

## 2025-08-21 SDOH — HEALTH STABILITY: MENTAL HEALTH: BEHAVIORS/MOOD: CALM;COOPERATIVE

## 2025-08-21 SDOH — HEALTH STABILITY: MENTAL HEALTH: NEEDS EXPRESSED: DENIES

## 2025-08-21 SDOH — SOCIAL STABILITY: SOCIAL INSECURITY: ABUSE: ADULT

## 2025-08-21 SDOH — SOCIAL STABILITY: SOCIAL INSECURITY: DO YOU FEEL UNSAFE GOING BACK TO THE PLACE WHERE YOU ARE LIVING?: UNABLE TO ASSESS

## 2025-08-21 SDOH — HEALTH STABILITY: PHYSICAL HEALTH
ON AVERAGE, HOW MANY DAYS PER WEEK DO YOU ENGAGE IN MODERATE TO STRENUOUS EXERCISE (LIKE A BRISK WALK)?: PATIENT UNABLE TO ANSWER

## 2025-08-21 SDOH — HEALTH STABILITY: MENTAL HEALTH: BEHAVIORAL HEALTH(WDL): EXCEPTIONS TO WDL

## 2025-08-21 SDOH — HEALTH STABILITY: MENTAL HEALTH

## 2025-08-21 SDOH — SOCIAL STABILITY: SOCIAL INSECURITY: DO YOU FEEL ANYONE HAS EXPLOITED OR TAKEN ADVANTAGE OF YOU FINANCIALLY OR OF YOUR PERSONAL PROPERTY?: UNABLE TO ASSESS

## 2025-08-21 SDOH — ECONOMIC STABILITY: FOOD INSECURITY
WITHIN THE PAST 12 MONTHS, THE FOOD YOU BOUGHT JUST DIDN'T LAST AND YOU DIDN'T HAVE MONEY TO GET MORE.: PATIENT UNABLE TO ANSWER

## 2025-08-21 SDOH — ECONOMIC STABILITY: INCOME INSECURITY
IN THE PAST 12 MONTHS HAS THE ELECTRIC, GAS, OIL, OR WATER COMPANY THREATENED TO SHUT OFF SERVICES IN YOUR HOME?: PATIENT UNABLE TO ANSWER

## 2025-08-21 SDOH — SOCIAL STABILITY: SOCIAL INSECURITY: WERE YOU ABLE TO COMPLETE ALL THE BEHAVIORAL HEALTH SCREENINGS?: NO

## 2025-08-21 SDOH — HEALTH STABILITY: MENTAL HEALTH
DO YOU FEEL STRESS - TENSE, RESTLESS, NERVOUS, OR ANXIOUS, OR UNABLE TO SLEEP AT NIGHT BECAUSE YOUR MIND IS TROUBLED ALL THE TIME - THESE DAYS?: PATIENT UNABLE TO ANSWER

## 2025-08-21 SDOH — SOCIAL STABILITY: SOCIAL INSECURITY: ARE YOU OR HAVE YOU BEEN THREATENED OR ABUSED PHYSICALLY, EMOTIONALLY, OR SEXUALLY BY ANYONE?: UNABLE TO ASSESS

## 2025-08-21 SDOH — SOCIAL STABILITY: SOCIAL INSECURITY: ARE THERE ANY APPARENT SIGNS OF INJURIES/BEHAVIORS THAT COULD BE RELATED TO ABUSE/NEGLECT?: UNABLE TO ASSESS

## 2025-08-21 SDOH — SOCIAL STABILITY: SOCIAL INSECURITY: HAS ANYONE EVER THREATENED TO HURT YOUR FAMILY OR YOUR PETS?: UNABLE TO ASSESS

## 2025-08-21 SDOH — SOCIAL STABILITY: SOCIAL NETWORK: IN A TYPICAL WEEK, HOW MANY TIMES DO YOU TALK ON THE PHONE WITH FAMILY, FRIENDS, OR NEIGHBORS?: PATIENT UNABLE TO ANSWER

## 2025-08-21 SDOH — SOCIAL STABILITY: SOCIAL INSECURITY: DOES ANYONE TRY TO KEEP YOU FROM HAVING/CONTACTING OTHER FRIENDS OR DOING THINGS OUTSIDE YOUR HOME?: UNABLE TO ASSESS

## 2025-08-21 SDOH — HEALTH STABILITY: MENTAL HEALTH: CONTENT: UNREMARKABLE

## 2025-08-21 SDOH — SOCIAL STABILITY: SOCIAL INSECURITY: HAVE YOU HAD THOUGHTS OF HARMING ANYONE ELSE?: UNABLE TO ASSESS

## 2025-08-21 SDOH — HEALTH STABILITY: MENTAL HEALTH: EXPERIENCED ANY OF THE FOLLOWING LIFE EVENTS: OTHER (COMMENT)

## 2025-08-21 ASSESSMENT — ACTIVITIES OF DAILY LIVING (ADL)
TOILETING: UNABLE TO ASSESS
GROOMING: UNABLE TO ASSESS
FEEDING YOURSELF: UNABLE TO ASSESS
PATIENT'S MEMORY ADEQUATE TO SAFELY COMPLETE DAILY ACTIVITIES?: UNABLE TO ASSESS
WALKS IN HOME: UNABLE TO ASSESS
JUDGMENT_ADEQUATE_SAFELY_COMPLETE_DAILY_ACTIVITIES: UNABLE TO ASSESS
HEARING - RIGHT EAR: UNABLE TO ASSESS
LACK_OF_TRANSPORTATION: PATIENT UNABLE TO ANSWER
DRESSING YOURSELF: UNABLE TO ASSESS
BATHING: UNABLE TO ASSESS
HEARING - LEFT EAR: UNABLE TO ASSESS
ADEQUATE_TO_COMPLETE_ADL: UNABLE TO ASSESS

## 2025-08-21 ASSESSMENT — COGNITIVE AND FUNCTIONAL STATUS - GENERAL
CLIMB 3 TO 5 STEPS WITH RAILING: TOTAL
TOILETING: A LOT
WALKING IN HOSPITAL ROOM: A LOT
PERSONAL GROOMING: A LOT
DRESSING REGULAR UPPER BODY CLOTHING: A LOT
DAILY ACTIVITIY SCORE: 13
HELP NEEDED FOR BATHING: A LOT
MOBILITY SCORE: 11
MOVING TO AND FROM BED TO CHAIR: A LOT
MOVING FROM LYING ON BACK TO SITTING ON SIDE OF FLAT BED WITH BEDRAILS: A LOT
DRESSING REGULAR LOWER BODY CLOTHING: A LOT
EATING MEALS: A LITTLE
TURNING FROM BACK TO SIDE WHILE IN FLAT BAD: A LOT
PATIENT BASELINE BEDBOUND: UNABLE TO ASSESS AT THIS TIME
STANDING UP FROM CHAIR USING ARMS: A LOT

## 2025-08-21 ASSESSMENT — PAIN SCALES - WONG BAKER: WONGBAKER_NUMERICALRESPONSE: NO HURT

## 2025-08-21 ASSESSMENT — ENCOUNTER SYMPTOMS
HALLUCINATIONS: 0
DIARRHEA: 0
PALPITATIONS: 0
CONSTIPATION: 0
ARTHRALGIAS: 0
COUGH: 0
DIZZINESS: 0
WOUND: 0
SORE THROAT: 0
SHORTNESS OF BREATH: 0
DYSURIA: 0
RHINORRHEA: 0
CONFUSION: 0
NAUSEA: 0
VOMITING: 0
CHILLS: 0
MYALGIAS: 0
FEVER: 0
HEMATURIA: 0
AGITATION: 1

## 2025-08-21 ASSESSMENT — LIFESTYLE VARIABLES
AUDIT-C TOTAL SCORE: 0
PRESCIPTION_ABUSE_PAST_12_MONTHS: NO
HOW MANY STANDARD DRINKS CONTAINING ALCOHOL DO YOU HAVE ON A TYPICAL DAY: PATIENT DOES NOT DRINK
AUDIT-C TOTAL SCORE: 0
SKIP TO QUESTIONS 9-10: 0
HOW OFTEN DO YOU HAVE A DRINK CONTAINING ALCOHOL: NEVER
SKIP TO QUESTIONS 9-10: 1
HOW OFTEN DO YOU HAVE 6 OR MORE DRINKS ON ONE OCCASION: NEVER
SUBSTANCE_ABUSE_PAST_12_MONTHS: NO
AUDIT-C TOTAL SCORE: -1

## 2025-08-21 ASSESSMENT — PATIENT HEALTH QUESTIONNAIRE - PHQ9
SUM OF ALL RESPONSES TO PHQ9 QUESTIONS 1 & 2: 0
1. LITTLE INTEREST OR PLEASURE IN DOING THINGS: NOT AT ALL
2. FEELING DOWN, DEPRESSED OR HOPELESS: NOT AT ALL

## 2025-08-21 ASSESSMENT — PAIN - FUNCTIONAL ASSESSMENT: PAIN_FUNCTIONAL_ASSESSMENT: WONG-BAKER FACES

## 2025-08-22 ENCOUNTER — APPOINTMENT (OUTPATIENT)
Dept: CARDIOLOGY | Facility: HOSPITAL | Age: 85
DRG: 884 | End: 2025-08-22
Payer: MEDICARE

## 2025-08-22 PROBLEM — N39.0 UTI (URINARY TRACT INFECTION), BACTERIAL: Status: RESOLVED | Noted: 2025-08-21 | Resolved: 2025-08-22

## 2025-08-22 PROBLEM — A49.9 UTI (URINARY TRACT INFECTION), BACTERIAL: Status: RESOLVED | Noted: 2025-08-21 | Resolved: 2025-08-22

## 2025-08-22 LAB
ANION GAP SERPL CALC-SCNC: 10 MMOL/L (ref 10–20)
BUN SERPL-MCNC: 10 MG/DL (ref 6–23)
CALCIUM SERPL-MCNC: 9.3 MG/DL (ref 8.6–10.3)
CHLORIDE SERPL-SCNC: 101 MMOL/L (ref 98–107)
CO2 SERPL-SCNC: 31 MMOL/L (ref 21–32)
CREAT SERPL-MCNC: 0.71 MG/DL (ref 0.5–1.05)
EGFRCR SERPLBLD CKD-EPI 2021: 83 ML/MIN/1.73M*2
ERYTHROCYTE [DISTWIDTH] IN BLOOD BY AUTOMATED COUNT: 12.6 % (ref 11.5–14.5)
GLUCOSE SERPL-MCNC: 90 MG/DL (ref 74–99)
HCT VFR BLD AUTO: 45.7 % (ref 36–46)
HGB BLD-MCNC: 15 G/DL (ref 12–16)
HOLD SPECIMEN: NORMAL
MCH RBC QN AUTO: 33.3 PG (ref 26–34)
MCHC RBC AUTO-ENTMCNC: 32.8 G/DL (ref 32–36)
MCV RBC AUTO: 102 FL (ref 80–100)
NRBC BLD-RTO: 0 /100 WBCS (ref 0–0)
PLATELET # BLD AUTO: 195 X10*3/UL (ref 150–450)
POTASSIUM SERPL-SCNC: 3.7 MMOL/L (ref 3.5–5.3)
RBC # BLD AUTO: 4.5 X10*6/UL (ref 4–5.2)
SODIUM SERPL-SCNC: 138 MMOL/L (ref 136–145)
WBC # BLD AUTO: 5.7 X10*3/UL (ref 4.4–11.3)

## 2025-08-22 PROCEDURE — G0378 HOSPITAL OBSERVATION PER HR: HCPCS

## 2025-08-22 PROCEDURE — 99239 HOSP IP/OBS DSCHRG MGMT >30: CPT | Performed by: INTERNAL MEDICINE

## 2025-08-22 PROCEDURE — 93005 ELECTROCARDIOGRAM TRACING: CPT

## 2025-08-22 PROCEDURE — 96372 THER/PROPH/DIAG INJ SC/IM: CPT | Performed by: NURSE PRACTITIONER

## 2025-08-22 PROCEDURE — 99223 1ST HOSP IP/OBS HIGH 75: CPT | Performed by: NURSE PRACTITIONER

## 2025-08-22 PROCEDURE — 36415 COLL VENOUS BLD VENIPUNCTURE: CPT | Performed by: INTERNAL MEDICINE

## 2025-08-22 PROCEDURE — 2500000001 HC RX 250 WO HCPCS SELF ADMINISTERED DRUGS (ALT 637 FOR MEDICARE OP): Performed by: INTERNAL MEDICINE

## 2025-08-22 PROCEDURE — 85027 COMPLETE CBC AUTOMATED: CPT | Performed by: INTERNAL MEDICINE

## 2025-08-22 PROCEDURE — 2500000002 HC RX 250 W HCPCS SELF ADMINISTERED DRUGS (ALT 637 FOR MEDICARE OP, ALT 636 FOR OP/ED): Performed by: INTERNAL MEDICINE

## 2025-08-22 PROCEDURE — 2500000004 HC RX 250 GENERAL PHARMACY W/ HCPCS (ALT 636 FOR OP/ED): Performed by: NURSE PRACTITIONER

## 2025-08-22 PROCEDURE — 97165 OT EVAL LOW COMPLEX 30 MIN: CPT | Mod: GO

## 2025-08-22 PROCEDURE — 97161 PT EVAL LOW COMPLEX 20 MIN: CPT | Mod: GP

## 2025-08-22 PROCEDURE — 2500000004 HC RX 250 GENERAL PHARMACY W/ HCPCS (ALT 636 FOR OP/ED): Performed by: INTERNAL MEDICINE

## 2025-08-22 PROCEDURE — 80048 BASIC METABOLIC PNL TOTAL CA: CPT | Performed by: INTERNAL MEDICINE

## 2025-08-22 RX ORDER — HALOPERIDOL LACTATE 5 MG/ML
5 INJECTION, SOLUTION INTRAMUSCULAR EVERY 8 HOURS PRN
Status: DISCONTINUED | OUTPATIENT
Start: 2025-08-22 | End: 2025-08-22

## 2025-08-22 RX ORDER — MIRTAZAPINE 15 MG/1
7.5 TABLET, FILM COATED ORAL NIGHTLY
Status: DISCONTINUED | OUTPATIENT
Start: 2025-08-22 | End: 2025-08-23 | Stop reason: HOSPADM

## 2025-08-22 RX ORDER — RISPERIDONE 1 MG/ML
0.5 SOLUTION ORAL NIGHTLY
Status: DISCONTINUED | OUTPATIENT
Start: 2025-08-22 | End: 2025-08-23 | Stop reason: HOSPADM

## 2025-08-22 RX ORDER — QUETIAPINE FUMARATE 25 MG/1
12.5 TABLET, FILM COATED ORAL EVERY 8 HOURS PRN
Status: DISCONTINUED | OUTPATIENT
Start: 2025-08-22 | End: 2025-08-23 | Stop reason: HOSPADM

## 2025-08-22 RX ORDER — RISPERIDONE 1 MG/ML
0.5 SOLUTION ORAL NIGHTLY
Qty: 15 ML | Refills: 0 | Status: SHIPPED | OUTPATIENT
Start: 2025-08-22 | End: 2025-09-21

## 2025-08-22 RX ORDER — HALOPERIDOL LACTATE 5 MG/ML
1 INJECTION, SOLUTION INTRAMUSCULAR EVERY 8 HOURS PRN
Status: DISCONTINUED | OUTPATIENT
Start: 2025-08-22 | End: 2025-08-23 | Stop reason: HOSPADM

## 2025-08-22 RX ORDER — MIRTAZAPINE 7.5 MG/1
7.5 TABLET, FILM COATED ORAL NIGHTLY
Qty: 30 TABLET | Refills: 0 | Status: SHIPPED | OUTPATIENT
Start: 2025-08-22 | End: 2025-09-21

## 2025-08-22 RX ORDER — HALOPERIDOL LACTATE 5 MG/ML
1 INJECTION, SOLUTION INTRAMUSCULAR EVERY 8 HOURS PRN
Status: DISCONTINUED | OUTPATIENT
Start: 2025-08-22 | End: 2025-08-22

## 2025-08-22 RX ORDER — AMOXICILLIN AND CLAVULANATE POTASSIUM 875; 125 MG/1; MG/1
1 TABLET, FILM COATED ORAL 2 TIMES DAILY
Qty: 10 TABLET | Refills: 0 | Status: SHIPPED | OUTPATIENT
Start: 2025-08-22 | End: 2025-08-27

## 2025-08-22 RX ORDER — QUETIAPINE FUMARATE 25 MG/1
12.5 TABLET, FILM COATED ORAL EVERY 8 HOURS PRN
Qty: 30 TABLET | Refills: 0 | Status: SHIPPED | OUTPATIENT
Start: 2025-08-22 | End: 2025-09-21

## 2025-08-22 RX ADMIN — LISINOPRIL 20 MG: 20 TABLET ORAL at 09:26

## 2025-08-22 RX ADMIN — HYDROXYZINE HYDROCHLORIDE 25 MG: 25 TABLET ORAL at 09:26

## 2025-08-22 RX ADMIN — SERTRALINE HYDROCHLORIDE 50 MG: 50 TABLET ORAL at 09:26

## 2025-08-22 RX ADMIN — HALOPERIDOL LACTATE 1 MG: 5 INJECTION, SOLUTION INTRAMUSCULAR at 22:29

## 2025-08-22 RX ADMIN — SODIUM CHLORIDE 100 ML/HR: 0.9 INJECTION, SOLUTION INTRAVENOUS at 15:22

## 2025-08-22 RX ADMIN — CEFTRIAXONE 1 G: 1 INJECTION, SOLUTION INTRAVENOUS at 15:22

## 2025-08-22 RX ADMIN — HALOPERIDOL LACTATE 1 MG: 5 INJECTION, SOLUTION INTRAMUSCULAR at 15:22

## 2025-08-22 RX ADMIN — POLYETHYLENE GLYCOL 3350 17 G: 17 POWDER, FOR SOLUTION ORAL at 09:26

## 2025-08-22 ASSESSMENT — COGNITIVE AND FUNCTIONAL STATUS - GENERAL
MOBILITY SCORE: 11
EATING MEALS: A LITTLE
WALKING IN HOSPITAL ROOM: A LOT
DAILY ACTIVITIY SCORE: 7
PERSONAL GROOMING: A LOT
HELP NEEDED FOR BATHING: A LOT
MOVING FROM LYING ON BACK TO SITTING ON SIDE OF FLAT BED WITH BEDRAILS: A LOT
HELP NEEDED FOR BATHING: TOTAL
TURNING FROM BACK TO SIDE WHILE IN FLAT BAD: A LOT
PERSONAL GROOMING: A LOT
DRESSING REGULAR UPPER BODY CLOTHING: A LOT
MOVING TO AND FROM BED TO CHAIR: A LOT
CLIMB 3 TO 5 STEPS WITH RAILING: TOTAL
DAILY ACTIVITIY SCORE: 13
HELP NEEDED FOR BATHING: A LOT
EATING MEALS: A LITTLE
MOBILITY SCORE: 8
TOILETING: TOTAL
WALKING IN HOSPITAL ROOM: A LOT
PERSONAL GROOMING: TOTAL
TOILETING: A LOT
TURNING FROM BACK TO SIDE WHILE IN FLAT BAD: A LOT
DRESSING REGULAR LOWER BODY CLOTHING: A LOT
TURNING FROM BACK TO SIDE WHILE IN FLAT BAD: A LOT
STANDING UP FROM CHAIR USING ARMS: TOTAL
MOVING FROM LYING ON BACK TO SITTING ON SIDE OF FLAT BED WITH BEDRAILS: A LOT
EATING MEALS: A LOT
DAILY ACTIVITIY SCORE: 13
DRESSING REGULAR UPPER BODY CLOTHING: A LOT
DRESSING REGULAR LOWER BODY CLOTHING: A LOT
MOVING TO AND FROM BED TO CHAIR: TOTAL
STANDING UP FROM CHAIR USING ARMS: A LOT
STANDING UP FROM CHAIR USING ARMS: A LOT
WALKING IN HOSPITAL ROOM: TOTAL
MOVING TO AND FROM BED TO CHAIR: A LOT
TOILETING: A LOT
CLIMB 3 TO 5 STEPS WITH RAILING: TOTAL
CLIMB 3 TO 5 STEPS WITH RAILING: TOTAL
DRESSING REGULAR UPPER BODY CLOTHING: TOTAL
MOBILITY SCORE: 11
MOVING FROM LYING ON BACK TO SITTING ON SIDE OF FLAT BED WITH BEDRAILS: A LOT
DRESSING REGULAR LOWER BODY CLOTHING: TOTAL

## 2025-08-22 ASSESSMENT — PAIN - FUNCTIONAL ASSESSMENT
PAIN_FUNCTIONAL_ASSESSMENT: 0-10
PAIN_FUNCTIONAL_ASSESSMENT: UNABLE TO SELF-REPORT
PAIN_FUNCTIONAL_ASSESSMENT: 0-10

## 2025-08-22 ASSESSMENT — PAIN SCALES - GENERAL
PAINLEVEL_OUTOF10: 0 - NO PAIN
PAINLEVEL_OUTOF10: 0 - NO PAIN

## 2025-08-23 VITALS
SYSTOLIC BLOOD PRESSURE: 157 MMHG | DIASTOLIC BLOOD PRESSURE: 84 MMHG | HEIGHT: 63 IN | BODY MASS INDEX: 20.31 KG/M2 | HEART RATE: 77 BPM | WEIGHT: 114.64 LBS | RESPIRATION RATE: 18 BRPM | TEMPERATURE: 97.3 F | OXYGEN SATURATION: 97 %

## 2025-08-23 PROBLEM — A49.9 UTI (URINARY TRACT INFECTION), BACTERIAL: Status: RESOLVED | Noted: 2025-08-23 | Resolved: 2025-08-23

## 2025-08-23 PROBLEM — N39.0 UTI (URINARY TRACT INFECTION), BACTERIAL: Status: RESOLVED | Noted: 2025-08-23 | Resolved: 2025-08-23

## 2025-08-23 PROBLEM — N39.0 UTI (URINARY TRACT INFECTION), BACTERIAL: Status: ACTIVE | Noted: 2025-08-23

## 2025-08-23 PROBLEM — A49.9 UTI (URINARY TRACT INFECTION), BACTERIAL: Status: ACTIVE | Noted: 2025-08-23

## 2025-08-23 LAB
ANION GAP SERPL CALC-SCNC: 12 MMOL/L (ref 10–20)
BUN SERPL-MCNC: 9 MG/DL (ref 6–23)
CALCIUM SERPL-MCNC: 9.2 MG/DL (ref 8.6–10.3)
CHLORIDE SERPL-SCNC: 102 MMOL/L (ref 98–107)
CO2 SERPL-SCNC: 26 MMOL/L (ref 21–32)
CREAT SERPL-MCNC: 0.54 MG/DL (ref 0.5–1.05)
EGFRCR SERPLBLD CKD-EPI 2021: 90 ML/MIN/1.73M*2
ERYTHROCYTE [DISTWIDTH] IN BLOOD BY AUTOMATED COUNT: 12.7 % (ref 11.5–14.5)
GLUCOSE SERPL-MCNC: 80 MG/DL (ref 74–99)
HCT VFR BLD AUTO: 40.9 % (ref 36–46)
HGB BLD-MCNC: 14 G/DL (ref 12–16)
MCH RBC QN AUTO: 34 PG (ref 26–34)
MCHC RBC AUTO-ENTMCNC: 34.2 G/DL (ref 32–36)
MCV RBC AUTO: 99 FL (ref 80–100)
NRBC BLD-RTO: 0 /100 WBCS (ref 0–0)
PLATELET # BLD AUTO: 215 X10*3/UL (ref 150–450)
POTASSIUM SERPL-SCNC: 3.4 MMOL/L (ref 3.5–5.3)
RBC # BLD AUTO: 4.12 X10*6/UL (ref 4–5.2)
SODIUM SERPL-SCNC: 137 MMOL/L (ref 136–145)
WBC # BLD AUTO: 5.6 X10*3/UL (ref 4.4–11.3)

## 2025-08-23 PROCEDURE — 36415 COLL VENOUS BLD VENIPUNCTURE: CPT | Performed by: INTERNAL MEDICINE

## 2025-08-23 PROCEDURE — 1100000001 HC PRIVATE ROOM DAILY

## 2025-08-23 PROCEDURE — 80048 BASIC METABOLIC PNL TOTAL CA: CPT | Performed by: INTERNAL MEDICINE

## 2025-08-23 PROCEDURE — 85027 COMPLETE CBC AUTOMATED: CPT | Performed by: INTERNAL MEDICINE

## 2025-08-23 PROCEDURE — 99232 SBSQ HOSP IP/OBS MODERATE 35: CPT | Performed by: INTERNAL MEDICINE

## 2025-08-23 PROCEDURE — 2500000001 HC RX 250 WO HCPCS SELF ADMINISTERED DRUGS (ALT 637 FOR MEDICARE OP): Performed by: INTERNAL MEDICINE

## 2025-08-23 PROCEDURE — 2500000002 HC RX 250 W HCPCS SELF ADMINISTERED DRUGS (ALT 637 FOR MEDICARE OP, ALT 636 FOR OP/ED): Performed by: INTERNAL MEDICINE

## 2025-08-23 RX ORDER — HYDRALAZINE HYDROCHLORIDE 20 MG/ML
10 INJECTION INTRAMUSCULAR; INTRAVENOUS EVERY 8 HOURS PRN
Status: DISCONTINUED | OUTPATIENT
Start: 2025-08-23 | End: 2025-08-23 | Stop reason: HOSPADM

## 2025-08-23 RX ORDER — POTASSIUM CHLORIDE 20 MEQ/1
20 TABLET, EXTENDED RELEASE ORAL ONCE
Status: COMPLETED | OUTPATIENT
Start: 2025-08-23 | End: 2025-08-23

## 2025-08-23 RX ADMIN — SERTRALINE HYDROCHLORIDE 50 MG: 50 TABLET ORAL at 09:17

## 2025-08-23 RX ADMIN — POTASSIUM CHLORIDE 20 MEQ: 1500 TABLET, EXTENDED RELEASE ORAL at 09:17

## 2025-08-23 RX ADMIN — LISINOPRIL 20 MG: 20 TABLET ORAL at 09:17

## 2025-08-23 ASSESSMENT — COGNITIVE AND FUNCTIONAL STATUS - GENERAL
WALKING IN HOSPITAL ROOM: A LOT
DRESSING REGULAR UPPER BODY CLOTHING: A LOT
DRESSING REGULAR LOWER BODY CLOTHING: A LOT
MOBILITY SCORE: 11
MOVING TO AND FROM BED TO CHAIR: A LOT
TURNING FROM BACK TO SIDE WHILE IN FLAT BAD: A LOT
STANDING UP FROM CHAIR USING ARMS: A LOT
TOILETING: A LOT
HELP NEEDED FOR BATHING: A LOT
EATING MEALS: A LITTLE
PERSONAL GROOMING: A LOT
DAILY ACTIVITIY SCORE: 13
CLIMB 3 TO 5 STEPS WITH RAILING: TOTAL
MOVING FROM LYING ON BACK TO SITTING ON SIDE OF FLAT BED WITH BEDRAILS: A LOT

## 2025-08-23 ASSESSMENT — PAIN - FUNCTIONAL ASSESSMENT: PAIN_FUNCTIONAL_ASSESSMENT: 0-10

## 2025-08-23 ASSESSMENT — PAIN SCALES - GENERAL: PAINLEVEL_OUTOF10: 0 - NO PAIN

## 2025-08-24 LAB — BACTERIA UR CULT: ABNORMAL

## 2025-08-27 LAB
ATRIAL RATE: 109 BPM
P AXIS: 48 DEGREES
PR INTERVAL: 156 MS
Q ONSET: 249 MS
QRS COUNT: 11 BEATS
QRS DURATION: 96 MS
QT INTERVAL: 421 MS
QTC CALCULATION(BAZETT): 421 MS
QTC FREDERICIA: 421 MS
R AXIS: -13 DEGREES
T AXIS: 58 DEGREES
T OFFSET: 460 MS
VENTRICULAR RATE: 60 BPM